# Patient Record
Sex: FEMALE | Race: WHITE | NOT HISPANIC OR LATINO | URBAN - METROPOLITAN AREA
[De-identification: names, ages, dates, MRNs, and addresses within clinical notes are randomized per-mention and may not be internally consistent; named-entity substitution may affect disease eponyms.]

---

## 2021-07-12 ENCOUNTER — TELEPHONE (OUTPATIENT)
Dept: OBSTETRICS AND GYNECOLOGY | Facility: CLINIC | Age: 21
End: 2021-07-12

## 2021-07-12 ENCOUNTER — OFFICE VISIT (OUTPATIENT)
Dept: OBSTETRICS AND GYNECOLOGY | Facility: CLINIC | Age: 21
End: 2021-07-12
Payer: COMMERCIAL

## 2021-07-12 VITALS
HEIGHT: 66 IN | BODY MASS INDEX: 22.15 KG/M2 | WEIGHT: 137.81 LBS | SYSTOLIC BLOOD PRESSURE: 100 MMHG | DIASTOLIC BLOOD PRESSURE: 70 MMHG

## 2021-07-12 DIAGNOSIS — Z01.419 WELL WOMAN EXAM WITH ROUTINE GYNECOLOGICAL EXAM: Primary | ICD-10-CM

## 2021-07-12 DIAGNOSIS — N63.0 BREAST LUMP: ICD-10-CM

## 2021-07-12 PROCEDURE — 87481 CANDIDA DNA AMP PROBE: CPT | Mod: 59 | Performed by: STUDENT IN AN ORGANIZED HEALTH CARE EDUCATION/TRAINING PROGRAM

## 2021-07-12 PROCEDURE — 99385 PREV VISIT NEW AGE 18-39: CPT | Mod: S$GLB,,, | Performed by: STUDENT IN AN ORGANIZED HEALTH CARE EDUCATION/TRAINING PROGRAM

## 2021-07-12 PROCEDURE — 87491 CHLMYD TRACH DNA AMP PROBE: CPT | Mod: 59 | Performed by: STUDENT IN AN ORGANIZED HEALTH CARE EDUCATION/TRAINING PROGRAM

## 2021-07-12 PROCEDURE — 87591 N.GONORRHOEAE DNA AMP PROB: CPT | Performed by: STUDENT IN AN ORGANIZED HEALTH CARE EDUCATION/TRAINING PROGRAM

## 2021-07-12 PROCEDURE — 99385 PR PREVENTIVE VISIT,NEW,18-39: ICD-10-PCS | Mod: S$GLB,,, | Performed by: STUDENT IN AN ORGANIZED HEALTH CARE EDUCATION/TRAINING PROGRAM

## 2021-07-15 LAB
BACTERIAL VAGINOSIS DNA: NEGATIVE
CANDIDA GLABRATA DNA: NEGATIVE
CANDIDA KRUSEI DNA: NEGATIVE
CANDIDA RRNA VAG QL PROBE: NEGATIVE
T VAGINALIS RRNA GENITAL QL PROBE: NEGATIVE

## 2021-07-16 LAB
C TRACH DNA SPEC QL NAA+PROBE: NOT DETECTED
N GONORRHOEA DNA SPEC QL NAA+PROBE: NOT DETECTED

## 2021-07-19 ENCOUNTER — HOSPITAL ENCOUNTER (OUTPATIENT)
Dept: RADIOLOGY | Facility: OTHER | Age: 21
Discharge: HOME | End: 2021-07-19
Attending: STUDENT IN AN ORGANIZED HEALTH CARE EDUCATION/TRAINING PROGRAM
Payer: COMMERCIAL

## 2021-07-19 DIAGNOSIS — N63.0 BREAST LUMP: ICD-10-CM

## 2021-07-19 PROCEDURE — 76642 ULTRASOUND BREAST LIMITED: CPT | Mod: TC,RT

## 2021-07-19 PROCEDURE — 76642 US BREAST RIGHT LIMITED: ICD-10-PCS | Mod: 26,RT,, | Performed by: RADIOLOGY

## 2021-07-19 PROCEDURE — 76642 ULTRASOUND BREAST LIMITED: CPT | Mod: 26,RT,, | Performed by: RADIOLOGY

## 2021-11-12 ENCOUNTER — OFFICE VISIT (OUTPATIENT)
Dept: UROLOGY | Facility: CLINIC | Age: 21
End: 2021-11-12
Payer: COMMERCIAL

## 2021-11-12 VITALS
HEART RATE: 109 BPM | SYSTOLIC BLOOD PRESSURE: 127 MMHG | TEMPERATURE: 99 F | WEIGHT: 133 LBS | DIASTOLIC BLOOD PRESSURE: 81 MMHG | BODY MASS INDEX: 21.38 KG/M2 | HEIGHT: 66 IN

## 2021-11-12 DIAGNOSIS — R30.0 DYSURIA: Primary | ICD-10-CM

## 2021-11-12 DIAGNOSIS — R39.15 URINARY URGENCY: ICD-10-CM

## 2021-11-12 DIAGNOSIS — N39.0 RECURRENT UTI: ICD-10-CM

## 2021-11-12 DIAGNOSIS — R35.0 URINARY FREQUENCY: ICD-10-CM

## 2021-11-12 PROCEDURE — 1160F RVW MEDS BY RX/DR IN RCRD: CPT | Mod: CPTII,S$GLB,, | Performed by: NURSE PRACTITIONER

## 2021-11-12 PROCEDURE — 3074F SYST BP LT 130 MM HG: CPT | Mod: CPTII,S$GLB,, | Performed by: NURSE PRACTITIONER

## 2021-11-12 PROCEDURE — 1159F MED LIST DOCD IN RCRD: CPT | Mod: CPTII,S$GLB,, | Performed by: NURSE PRACTITIONER

## 2021-11-12 PROCEDURE — 1160F PR REVIEW ALL MEDS BY PRESCRIBER/CLIN PHARMACIST DOCUMENTED: ICD-10-PCS | Mod: CPTII,S$GLB,, | Performed by: NURSE PRACTITIONER

## 2021-11-12 PROCEDURE — 87086 URINE CULTURE/COLONY COUNT: CPT | Performed by: NURSE PRACTITIONER

## 2021-11-12 PROCEDURE — 3079F DIAST BP 80-89 MM HG: CPT | Mod: CPTII,S$GLB,, | Performed by: NURSE PRACTITIONER

## 2021-11-12 PROCEDURE — 3079F PR MOST RECENT DIASTOLIC BLOOD PRESSURE 80-89 MM HG: ICD-10-PCS | Mod: CPTII,S$GLB,, | Performed by: NURSE PRACTITIONER

## 2021-11-12 PROCEDURE — 3074F PR MOST RECENT SYSTOLIC BLOOD PRESSURE < 130 MM HG: ICD-10-PCS | Mod: CPTII,S$GLB,, | Performed by: NURSE PRACTITIONER

## 2021-11-12 PROCEDURE — 3008F PR BODY MASS INDEX (BMI) DOCUMENTED: ICD-10-PCS | Mod: CPTII,S$GLB,, | Performed by: NURSE PRACTITIONER

## 2021-11-12 PROCEDURE — 1159F PR MEDICATION LIST DOCUMENTED IN MEDICAL RECORD: ICD-10-PCS | Mod: CPTII,S$GLB,, | Performed by: NURSE PRACTITIONER

## 2021-11-12 PROCEDURE — 99203 OFFICE O/P NEW LOW 30 MIN: CPT | Mod: S$GLB,,, | Performed by: NURSE PRACTITIONER

## 2021-11-12 PROCEDURE — 3008F BODY MASS INDEX DOCD: CPT | Mod: CPTII,S$GLB,, | Performed by: NURSE PRACTITIONER

## 2021-11-12 PROCEDURE — 99203 PR OFFICE/OUTPT VISIT, NEW, LEVL III, 30-44 MIN: ICD-10-PCS | Mod: S$GLB,,, | Performed by: NURSE PRACTITIONER

## 2021-11-12 RX ORDER — NITROFURANTOIN MACROCRYSTALS 50 MG/1
50 CAPSULE ORAL NIGHTLY
Qty: 90 CAPSULE | Refills: 1 | Status: SHIPPED | OUTPATIENT
Start: 2021-11-12 | End: 2022-05-11

## 2021-11-12 RX ORDER — SULFAMETHOXAZOLE AND TRIMETHOPRIM 800; 160 MG/1; MG/1
1 TABLET ORAL 2 TIMES DAILY
Qty: 20 TABLET | Refills: 0 | Status: SHIPPED | OUTPATIENT
Start: 2021-11-12 | End: 2021-11-22

## 2021-11-12 RX ORDER — NITROFURANTOIN (MACROCRYSTALS) 100 MG/1
100 CAPSULE ORAL 2 TIMES DAILY
COMMUNITY

## 2021-11-16 LAB — BACTERIA UR CULT: NO GROWTH

## 2024-09-24 ENCOUNTER — OFFICE VISIT (OUTPATIENT)
Dept: PRIMARY CARE CLINIC | Facility: CLINIC | Age: 24
End: 2024-09-24
Payer: COMMERCIAL

## 2024-09-24 ENCOUNTER — PATIENT MESSAGE (OUTPATIENT)
Dept: PRIMARY CARE CLINIC | Facility: CLINIC | Age: 24
End: 2024-09-24

## 2024-09-24 ENCOUNTER — LAB VISIT (OUTPATIENT)
Dept: LAB | Facility: HOSPITAL | Age: 24
End: 2024-09-24
Attending: STUDENT IN AN ORGANIZED HEALTH CARE EDUCATION/TRAINING PROGRAM
Payer: COMMERCIAL

## 2024-09-24 VITALS
WEIGHT: 125.69 LBS | HEIGHT: 66 IN | HEART RATE: 68 BPM | SYSTOLIC BLOOD PRESSURE: 124 MMHG | BODY MASS INDEX: 20.2 KG/M2 | DIASTOLIC BLOOD PRESSURE: 84 MMHG

## 2024-09-24 DIAGNOSIS — Z00.00 ANNUAL PHYSICAL EXAM: ICD-10-CM

## 2024-09-24 DIAGNOSIS — Z76.89 ESTABLISHING CARE WITH NEW DOCTOR, ENCOUNTER FOR: ICD-10-CM

## 2024-09-24 DIAGNOSIS — F41.8 TEST ANXIETY: ICD-10-CM

## 2024-09-24 DIAGNOSIS — Z78.9 VEGETARIAN: ICD-10-CM

## 2024-09-24 DIAGNOSIS — R79.89 ELEVATED LFTS: Primary | ICD-10-CM

## 2024-09-24 DIAGNOSIS — Z00.00 ANNUAL PHYSICAL EXAM: Primary | ICD-10-CM

## 2024-09-24 LAB
25(OH)D3+25(OH)D2 SERPL-MCNC: 34 NG/ML (ref 30–96)
ALBUMIN SERPL BCP-MCNC: 4.7 G/DL (ref 3.5–5.2)
ALP SERPL-CCNC: 55 U/L (ref 55–135)
ALT SERPL W/O P-5'-P-CCNC: 24 U/L (ref 10–44)
ANION GAP SERPL CALC-SCNC: 9 MMOL/L (ref 8–16)
AST SERPL-CCNC: 52 U/L (ref 10–40)
BASOPHILS # BLD AUTO: 0.03 K/UL (ref 0–0.2)
BASOPHILS NFR BLD: 0.6 % (ref 0–1.9)
BILIRUB SERPL-MCNC: 0.6 MG/DL (ref 0.1–1)
BUN SERPL-MCNC: 18 MG/DL (ref 6–20)
CALCIUM SERPL-MCNC: 10 MG/DL (ref 8.7–10.5)
CHLORIDE SERPL-SCNC: 105 MMOL/L (ref 95–110)
CHOLEST SERPL-MCNC: 209 MG/DL (ref 120–199)
CHOLEST/HDLC SERPL: 3.5 {RATIO} (ref 2–5)
CO2 SERPL-SCNC: 25 MMOL/L (ref 23–29)
CREAT SERPL-MCNC: 0.8 MG/DL (ref 0.5–1.4)
DIFFERENTIAL METHOD BLD: NORMAL
EOSINOPHIL # BLD AUTO: 0.1 K/UL (ref 0–0.5)
EOSINOPHIL NFR BLD: 2.4 % (ref 0–8)
ERYTHROCYTE [DISTWIDTH] IN BLOOD BY AUTOMATED COUNT: 12 % (ref 11.5–14.5)
EST. GFR  (NO RACE VARIABLE): >60 ML/MIN/1.73 M^2
ESTIMATED AVG GLUCOSE: 88 MG/DL (ref 68–131)
FERRITIN SERPL-MCNC: 35 NG/ML (ref 20–300)
GLUCOSE SERPL-MCNC: 85 MG/DL (ref 70–110)
HBA1C MFR BLD: 4.7 % (ref 4–5.6)
HCT VFR BLD AUTO: 45.7 % (ref 37–48.5)
HCV AB SERPL QL IA: NORMAL
HDLC SERPL-MCNC: 60 MG/DL (ref 40–75)
HDLC SERPL: 28.7 % (ref 20–50)
HGB BLD-MCNC: 15.1 G/DL (ref 12–16)
HIV 1+2 AB+HIV1 P24 AG SERPL QL IA: NORMAL
IMM GRANULOCYTES # BLD AUTO: 0.02 K/UL (ref 0–0.04)
IMM GRANULOCYTES NFR BLD AUTO: 0.4 % (ref 0–0.5)
IRON SERPL-MCNC: 113 UG/DL (ref 30–160)
LDLC SERPL CALC-MCNC: 135.8 MG/DL (ref 63–159)
LYMPHOCYTES # BLD AUTO: 1.7 K/UL (ref 1–4.8)
LYMPHOCYTES NFR BLD: 30.4 % (ref 18–48)
MCH RBC QN AUTO: 29.2 PG (ref 27–31)
MCHC RBC AUTO-ENTMCNC: 33 G/DL (ref 32–36)
MCV RBC AUTO: 88 FL (ref 82–98)
MONOCYTES # BLD AUTO: 0.5 K/UL (ref 0.3–1)
MONOCYTES NFR BLD: 8.5 % (ref 4–15)
NEUTROPHILS # BLD AUTO: 3.1 K/UL (ref 1.8–7.7)
NEUTROPHILS NFR BLD: 57.7 % (ref 38–73)
NONHDLC SERPL-MCNC: 149 MG/DL
NRBC BLD-RTO: 0 /100 WBC
PLATELET # BLD AUTO: 279 K/UL (ref 150–450)
PMV BLD AUTO: 10.2 FL (ref 9.2–12.9)
POTASSIUM SERPL-SCNC: 4.4 MMOL/L (ref 3.5–5.1)
PROT SERPL-MCNC: 8.2 G/DL (ref 6–8.4)
RBC # BLD AUTO: 5.17 M/UL (ref 4–5.4)
SATURATED IRON: 26 % (ref 20–50)
SODIUM SERPL-SCNC: 139 MMOL/L (ref 136–145)
TOTAL IRON BINDING CAPACITY: 429 UG/DL (ref 250–450)
TRANSFERRIN SERPL-MCNC: 290 MG/DL (ref 200–375)
TRIGL SERPL-MCNC: 66 MG/DL (ref 30–150)
TSH SERPL DL<=0.005 MIU/L-ACNC: 1.04 UIU/ML (ref 0.4–4)
VIT B12 SERPL-MCNC: 498 PG/ML (ref 210–950)
WBC # BLD AUTO: 5.42 K/UL (ref 3.9–12.7)

## 2024-09-24 PROCEDURE — 86803 HEPATITIS C AB TEST: CPT | Performed by: STUDENT IN AN ORGANIZED HEALTH CARE EDUCATION/TRAINING PROGRAM

## 2024-09-24 PROCEDURE — 85025 COMPLETE CBC W/AUTO DIFF WBC: CPT | Performed by: STUDENT IN AN ORGANIZED HEALTH CARE EDUCATION/TRAINING PROGRAM

## 2024-09-24 PROCEDURE — 83036 HEMOGLOBIN GLYCOSYLATED A1C: CPT | Performed by: STUDENT IN AN ORGANIZED HEALTH CARE EDUCATION/TRAINING PROGRAM

## 2024-09-24 PROCEDURE — 3008F BODY MASS INDEX DOCD: CPT | Mod: CPTII,S$GLB,, | Performed by: STUDENT IN AN ORGANIZED HEALTH CARE EDUCATION/TRAINING PROGRAM

## 2024-09-24 PROCEDURE — 80053 COMPREHEN METABOLIC PANEL: CPT | Performed by: STUDENT IN AN ORGANIZED HEALTH CARE EDUCATION/TRAINING PROGRAM

## 2024-09-24 PROCEDURE — 82728 ASSAY OF FERRITIN: CPT | Performed by: STUDENT IN AN ORGANIZED HEALTH CARE EDUCATION/TRAINING PROGRAM

## 2024-09-24 PROCEDURE — 80061 LIPID PANEL: CPT | Performed by: STUDENT IN AN ORGANIZED HEALTH CARE EDUCATION/TRAINING PROGRAM

## 2024-09-24 PROCEDURE — 84443 ASSAY THYROID STIM HORMONE: CPT | Performed by: STUDENT IN AN ORGANIZED HEALTH CARE EDUCATION/TRAINING PROGRAM

## 2024-09-24 PROCEDURE — 1159F MED LIST DOCD IN RCRD: CPT | Mod: CPTII,S$GLB,, | Performed by: STUDENT IN AN ORGANIZED HEALTH CARE EDUCATION/TRAINING PROGRAM

## 2024-09-24 PROCEDURE — 3074F SYST BP LT 130 MM HG: CPT | Mod: CPTII,S$GLB,, | Performed by: STUDENT IN AN ORGANIZED HEALTH CARE EDUCATION/TRAINING PROGRAM

## 2024-09-24 PROCEDURE — 36415 COLL VENOUS BLD VENIPUNCTURE: CPT | Mod: PN | Performed by: STUDENT IN AN ORGANIZED HEALTH CARE EDUCATION/TRAINING PROGRAM

## 2024-09-24 PROCEDURE — 82607 VITAMIN B-12: CPT | Performed by: STUDENT IN AN ORGANIZED HEALTH CARE EDUCATION/TRAINING PROGRAM

## 2024-09-24 PROCEDURE — 87389 HIV-1 AG W/HIV-1&-2 AB AG IA: CPT | Performed by: STUDENT IN AN ORGANIZED HEALTH CARE EDUCATION/TRAINING PROGRAM

## 2024-09-24 PROCEDURE — 3079F DIAST BP 80-89 MM HG: CPT | Mod: CPTII,S$GLB,, | Performed by: STUDENT IN AN ORGANIZED HEALTH CARE EDUCATION/TRAINING PROGRAM

## 2024-09-24 PROCEDURE — 1160F RVW MEDS BY RX/DR IN RCRD: CPT | Mod: CPTII,S$GLB,, | Performed by: STUDENT IN AN ORGANIZED HEALTH CARE EDUCATION/TRAINING PROGRAM

## 2024-09-24 PROCEDURE — 82306 VITAMIN D 25 HYDROXY: CPT | Performed by: STUDENT IN AN ORGANIZED HEALTH CARE EDUCATION/TRAINING PROGRAM

## 2024-09-24 PROCEDURE — 99204 OFFICE O/P NEW MOD 45 MIN: CPT | Mod: S$GLB,,, | Performed by: STUDENT IN AN ORGANIZED HEALTH CARE EDUCATION/TRAINING PROGRAM

## 2024-09-24 PROCEDURE — 83540 ASSAY OF IRON: CPT | Performed by: STUDENT IN AN ORGANIZED HEALTH CARE EDUCATION/TRAINING PROGRAM

## 2024-09-24 PROCEDURE — 99999 PR PBB SHADOW E&M-EST. PATIENT-LVL III: CPT | Mod: PBBFAC,,, | Performed by: STUDENT IN AN ORGANIZED HEALTH CARE EDUCATION/TRAINING PROGRAM

## 2024-09-24 RX ORDER — PROPRANOLOL HYDROCHLORIDE 10 MG/1
10 TABLET ORAL DAILY PRN
Qty: 30 TABLET | Refills: 0 | Status: SHIPPED | OUTPATIENT
Start: 2024-09-24

## 2024-09-24 NOTE — PROGRESS NOTES
Maty Lea  2000        Subjective     Chief Complaint: Est Care    History of Present Illness:  Ms. Maty Lea is a 24 y.o. female who presents to clinic for est care and annual.     Recently started limiting red meat and sugar. Has lost some weight intentionally. Would like to check vitamins/labs.    In OT school. Has been having some test anxiety/ with presentations. Feels very anxious physically, racing heart, sweating, etc right before presenting and for exams.     Due for pap smear.   Has appt with new OBGYN on 1/17.  Due for HPV vaccine series as well. Has not had. Discussed MOA.   She will consider it.    BP Readings from Last 5 Encounters:   09/24/24 124/84   11/12/21 127/81   07/12/21 100/70       Review of Systems   Constitutional:  Negative for chills, fever and malaise/fatigue.   HENT:  Negative for congestion.    Respiratory:  Negative for cough, sputum production, shortness of breath and wheezing.    Gastrointestinal:  Negative for nausea and vomiting.   Musculoskeletal:  Negative for joint pain.   Neurological:  Negative for sensory change, speech change, focal weakness and weakness.   Psychiatric/Behavioral:  The patient is not nervous/anxious.         PAST HISTORY:     History reviewed. No pertinent past medical history.    History reviewed. No pertinent surgical history.    Family History   Problem Relation Name Age of Onset    Diabetes Paternal Grandmother      Stroke Paternal Grandmother      Breast cancer Neg Hx      Colon cancer Neg Hx      Ovarian cancer Neg Hx           MEDICATIONS & ALLERGIES:     Current Outpatient Medications on File Prior to Visit   Medication Sig    [DISCONTINUED] ibuprofen (ADVIL,MOTRIN) 100 MG tablet Take 100 mg by mouth every 6 (six) hours as needed for Temperature greater than.    [DISCONTINUED] methen-m.blue-s.phos-phsal-hyo (URIBEL) 118-10-40.8-36 mg Cap Take 1 capsule by mouth 3 (three) times daily as needed (bladder pain).     "[DISCONTINUED] nitrofurantoin (MACRODANTIN) 100 MG capsule Take 100 mg by mouth 2 (two) times a day.    [DISCONTINUED] UNKNOWN TO PATIENT Inject into the muscle once. Cetriaxone sodium injection     No current facility-administered medications on file prior to visit.       Review of patient's allergies indicates:  No Known Allergies    OBJECTIVE:     Vital Signs:  Vitals:    09/24/24 0749   BP: 124/84   BP Location: Left arm   Patient Position: Sitting   BP Method: Medium (Manual)   Pulse: 68   Weight: 57 kg (125 lb 10.6 oz)   Height: 5' 6" (1.676 m)       Body mass index is 20.28 kg/m².     Physical Exam:  Physical Exam  Vitals and nursing note reviewed.   Constitutional:       General: She is not in acute distress.     Appearance: Normal appearance. She is not ill-appearing, toxic-appearing or diaphoretic.   HENT:      Head: Normocephalic and atraumatic.      Right Ear: Tympanic membrane, ear canal and external ear normal.      Left Ear: Tympanic membrane, ear canal and external ear normal.      Mouth/Throat:      Mouth: Mucous membranes are moist.      Pharynx: No oropharyngeal exudate or posterior oropharyngeal erythema.   Eyes:      General: No scleral icterus.        Right eye: No discharge.         Left eye: No discharge.      Conjunctiva/sclera: Conjunctivae normal.   Cardiovascular:      Rate and Rhythm: Normal rate and regular rhythm.      Pulses: Normal pulses.      Heart sounds: Normal heart sounds. No murmur heard.  Pulmonary:      Effort: Pulmonary effort is normal. No respiratory distress.      Breath sounds: Normal breath sounds. No wheezing.   Abdominal:      Tenderness: There is no right CVA tenderness or left CVA tenderness.   Musculoskeletal:         General: Normal range of motion.      Cervical back: Normal range of motion and neck supple. No rigidity or tenderness.      Right lower leg: No edema.      Left lower leg: No edema.   Lymphadenopathy:      Cervical: No cervical adenopathy.   Skin:   " "  General: Skin is warm and dry.   Neurological:      Mental Status: She is alert and oriented to person, place, and time. Mental status is at baseline.      Gait: Gait normal.   Psychiatric:         Mood and Affect: Mood normal.         Behavior: Behavior normal.            Laboratory  No results found for: "GLU", "NA", "K", "CL", "CO2", "BUN", "CREATININE", "CALCIUM", "MG"  No results found for: "HGBA1C"  No results for input(s): "POCTGLUCOSE" in the last 72 hours.        ASSESSMENT & PLAN:   Ms. Maty Lea is a 24 y.o. female who was seen today in clinic for est care, annual, test anxiety. Trial low dose Propranolol prn. SE and MOA of BB discussed. Break in half or stop using if dizziness/light-headedness occurs.   Discussed HPV series. Would recommend. Has OBGYN for pap coming up.     1. Annual physical exam  -     CBC Auto Differential; Future; Expected date: 09/24/2024  -     Comprehensive Metabolic Panel; Future; Expected date: 09/24/2024  -     Hemoglobin A1C; Future; Expected date: 09/24/2024  -     Lipid Panel; Future; Expected date: 09/24/2024  -     TSH; Future; Expected date: 09/24/2024  -     Vitamin D; Future; Expected date: 09/24/2024  -     Hepatitis C Antibody; Future; Expected date: 09/24/2024  -     HIV 1/2 Ag/Ab (4th Gen); Future; Expected date: 09/24/2024  -     IRON AND TIBC; Future; Expected date: 09/24/2024  -     Ferritin; Future  -     VITAMIN B12; Future; Expected date: 09/24/2024    2. Establishing care with new doctor, encounter for  -     CBC Auto Differential; Future; Expected date: 09/24/2024  -     Comprehensive Metabolic Panel; Future; Expected date: 09/24/2024  -     Hemoglobin A1C; Future; Expected date: 09/24/2024  -     Lipid Panel; Future; Expected date: 09/24/2024  -     TSH; Future; Expected date: 09/24/2024  -     Vitamin D; Future; Expected date: 09/24/2024  -     Hepatitis C Antibody; Future; Expected date: 09/24/2024  -     HIV 1/2 Ag/Ab (4th Gen); Future; " Expected date: 09/24/2024  -     IRON AND TIBC; Future; Expected date: 09/24/2024  -     Ferritin; Future  -     VITAMIN B12; Future; Expected date: 09/24/2024    3. Test anxiety  -     propranoloL (INDERAL) 10 MG tablet; Take 1 tablet (10 mg total) by mouth daily as needed (Test anxiety/presentations). Monitor for dizziness/light-headedness  Dispense: 30 tablet; Refill: 0  -     CBC Auto Differential; Future; Expected date: 09/24/2024  -     Comprehensive Metabolic Panel; Future; Expected date: 09/24/2024  -     Hemoglobin A1C; Future; Expected date: 09/24/2024  -     Lipid Panel; Future; Expected date: 09/24/2024  -     TSH; Future; Expected date: 09/24/2024  -     Vitamin D; Future; Expected date: 09/24/2024  -     Hepatitis C Antibody; Future; Expected date: 09/24/2024  -     HIV 1/2 Ag/Ab (4th Gen); Future; Expected date: 09/24/2024  -     IRON AND TIBC; Future; Expected date: 09/24/2024  -     Ferritin; Future  -     VITAMIN B12; Future; Expected date: 09/24/2024    4. Vegetarian  -     CBC Auto Differential; Future; Expected date: 09/24/2024  -     Comprehensive Metabolic Panel; Future; Expected date: 09/24/2024  -     Hemoglobin A1C; Future; Expected date: 09/24/2024  -     Lipid Panel; Future; Expected date: 09/24/2024  -     TSH; Future; Expected date: 09/24/2024  -     Vitamin D; Future; Expected date: 09/24/2024  -     Hepatitis C Antibody; Future; Expected date: 09/24/2024  -     HIV 1/2 Ag/Ab (4th Gen); Future; Expected date: 09/24/2024  -     IRON AND TIBC; Future; Expected date: 09/24/2024  -     Ferritin; Future  -     VITAMIN B12; Future; Expected date: 09/24/2024             Becca Mccall MD

## 2024-10-21 DIAGNOSIS — F41.8 TEST ANXIETY: ICD-10-CM

## 2024-10-21 NOTE — TELEPHONE ENCOUNTER
Refill Routing Note   Medication(s) are not appropriate for processing by [facility] for the following reason(s):        Outside of protocol: PRN use oop  New or recently adjusted medication    [facility] action(s):  Route  Defer             Appointments  past 12m or future 3m with PCP    Date Provider   Last Visit   9/24/2024 Becca Mccall MD   Next Visit   Visit date not found Becca Mccall MD   ED visits in past 90 days: 0        Note composed:5:36 PM 10/21/2024

## 2024-10-21 NOTE — TELEPHONE ENCOUNTER
No care due was identified.  [facility] Embedded Care Due Messages. Reference number: [id number].   10/21/2024 3:07:33 AM CDT

## 2024-10-22 RX ORDER — PROPRANOLOL HYDROCHLORIDE 10 MG/1
TABLET ORAL
Qty: 90 TABLET | Refills: 3 | Status: SHIPPED | OUTPATIENT
Start: 2024-10-22

## 2024-10-30 ENCOUNTER — TELEPHONE (OUTPATIENT)
Dept: OBSTETRICS AND GYNECOLOGY | Facility: CLINIC | Age: 24
End: 2024-10-30
Payer: COMMERCIAL

## 2025-01-07 ENCOUNTER — LAB VISIT (OUTPATIENT)
Dept: LAB | Facility: HOSPITAL | Age: 25
End: 2025-01-07
Attending: STUDENT IN AN ORGANIZED HEALTH CARE EDUCATION/TRAINING PROGRAM
Payer: COMMERCIAL

## 2025-01-07 DIAGNOSIS — R79.89 ELEVATED LFTS: ICD-10-CM

## 2025-01-07 LAB
ALBUMIN SERPL BCP-MCNC: 4.1 G/DL (ref 3.5–5.2)
ALP SERPL-CCNC: 44 U/L (ref 40–150)
ALT SERPL W/O P-5'-P-CCNC: 13 U/L (ref 10–44)
AST SERPL-CCNC: 20 U/L (ref 10–40)
BILIRUB DIRECT SERPL-MCNC: 0.2 MG/DL (ref 0.1–0.3)
BILIRUB SERPL-MCNC: 0.6 MG/DL (ref 0.1–1)
PROT SERPL-MCNC: 7.4 G/DL (ref 6–8.4)

## 2025-01-07 PROCEDURE — 80076 HEPATIC FUNCTION PANEL: CPT | Performed by: STUDENT IN AN ORGANIZED HEALTH CARE EDUCATION/TRAINING PROGRAM

## 2025-01-07 PROCEDURE — 36415 COLL VENOUS BLD VENIPUNCTURE: CPT | Performed by: STUDENT IN AN ORGANIZED HEALTH CARE EDUCATION/TRAINING PROGRAM

## 2025-01-28 ENCOUNTER — OFFICE VISIT (OUTPATIENT)
Dept: OBSTETRICS AND GYNECOLOGY | Facility: CLINIC | Age: 25
End: 2025-01-28
Payer: COMMERCIAL

## 2025-01-28 VITALS
BODY MASS INDEX: 20.37 KG/M2 | DIASTOLIC BLOOD PRESSURE: 77 MMHG | WEIGHT: 126.75 LBS | SYSTOLIC BLOOD PRESSURE: 129 MMHG | HEIGHT: 66 IN

## 2025-01-28 DIAGNOSIS — Z01.419 ENCOUNTER FOR ANNUAL ROUTINE GYNECOLOGICAL EXAMINATION: Primary | ICD-10-CM

## 2025-01-28 PROCEDURE — 99999 PR PBB SHADOW E&M-EST. PATIENT-LVL III: CPT | Mod: PBBFAC,,, | Performed by: OBSTETRICS & GYNECOLOGY

## 2025-01-28 PROCEDURE — 3008F BODY MASS INDEX DOCD: CPT | Mod: CPTII,S$GLB,, | Performed by: OBSTETRICS & GYNECOLOGY

## 2025-01-28 PROCEDURE — 1160F RVW MEDS BY RX/DR IN RCRD: CPT | Mod: CPTII,S$GLB,, | Performed by: OBSTETRICS & GYNECOLOGY

## 2025-01-28 PROCEDURE — 1159F MED LIST DOCD IN RCRD: CPT | Mod: CPTII,S$GLB,, | Performed by: OBSTETRICS & GYNECOLOGY

## 2025-01-28 PROCEDURE — 3078F DIAST BP <80 MM HG: CPT | Mod: CPTII,S$GLB,, | Performed by: OBSTETRICS & GYNECOLOGY

## 2025-01-28 PROCEDURE — 99385 PREV VISIT NEW AGE 18-39: CPT | Mod: S$GLB,,, | Performed by: OBSTETRICS & GYNECOLOGY

## 2025-01-28 PROCEDURE — 88175 CYTOPATH C/V AUTO FLUID REDO: CPT | Performed by: OBSTETRICS & GYNECOLOGY

## 2025-01-28 PROCEDURE — 3074F SYST BP LT 130 MM HG: CPT | Mod: CPTII,S$GLB,, | Performed by: OBSTETRICS & GYNECOLOGY

## 2025-01-28 NOTE — PROGRESS NOTES
SUBJECTIVE:     Chief Complaint: Well Woman       History of Present Illness:  Annual Exam  Patient presents for annual exam.   She c/o nothing today.  LMP: 1/11/25  She denies any vd, vb, dyspareunia, dysuria, depression, anxiety.  Last pap was in na and was na. HPV na.  Birth Control: none, declines  declines STD testing.   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  Not having penetrative sex.   Has not had HPV vaccine, would like to get today.     GYN screening history:  denies  Mammogram history: none  Colonoscopy history: none  Dexa history: none    FH:   Breast cancer: none  Colon cancer: none  Ovarian cancer: none    Review of patient's allergies indicates:  No Known Allergies    No past medical history on file.  No past surgical history on file.  OB History    No obstetric history on file.       Family History   Problem Relation Name Age of Onset    Diabetes Paternal Grandmother      Stroke Paternal Grandmother      Breast cancer Neg Hx      Colon cancer Neg Hx      Ovarian cancer Neg Hx       Social History     Tobacco Use    Smoking status: Never    Smokeless tobacco: Never   Substance Use Topics    Alcohol use: Yes    Drug use: Never       Current Outpatient Medications   Medication Sig    propranoloL (INDERAL) 10 MG tablet TAKE 1 TABLET BY MOUTH EVERY DAY AS NEEDED. MONITOR FOR DIZZINESS/LIGHT-HEADEDNESS     No current facility-administered medications for this visit.       Review of Systems:  GENERAL: No fever, chills, fatigability or weight loss.  CARDIOVASCULAR: No chest pain. No palpitations.  RESPIRATORY: No SOB, no wheezing.  BREAST: Denies pain. No lumps. No discharge.  VULVAR: No pain, no lesions and no itching.  VAGINAL: No relaxation, no itching, no discharge, no abnormal bleeding and no lesions.  ABDOMEN: No abdominal pain. Denies nausea. Denies vomiting. No diarrhea. No constipation  URINARY: No incontinence, no nocturia, no frequency and no dysuria.  NEUROLOGICAL: No headaches. No  vision changes.       OBJECTIVE:     Vitals:    01/28/25 1029   BP: 129/77       Patient verbally consents to pelvic and breast exams. Female chaperone present for exams.   Physical Exam:  Gen: NAD, well developed, well-nourished  HEENT: Normocephalic, atraumatic  Eyes: EOM nl, conjuntivae normal  Neck: ROM normal, no thyromegaly  Respiratory: Effort normal   Abd: soft, nontender, no masses palpated  Breast: Normal bilaterally, no masses, lesions or tenderness. No nipple discharge on expression, no lymphadenopathy bilaterally.  SSE:  Vulva: no lesions or rashes  Cervix: No lesions noted, nonfriable, no vaginal discharge or vaginal bleeding noted  BME:   Cervix: No CMT  Adnexa: nl bilaterally, no masses or fullness palpated  Uterus: normal, nonenlarged  Musculoskeletal: normal ROM  Neuro: alert, AAOx3  Skin: warm and dry  Psych: mood/affect nl, behavior normal, judgement normal, thought content normal        ASSESSMENT:       ICD-10-CM ICD-9-CM    1. Encounter for annual routine gynecological examination  Z01.419 V72.31 Liquid-Based Pap Smear, Screening             Plan:      Maty was seen today for well woman.    Diagnoses and all orders for this visit:    Encounter for annual routine gynecological examination  -     Liquid-Based Pap Smear, Screening    Other orders  -     HPV Vaccine (9-Valent) (3 Dose) (IM); Future        Orders Placed This Encounter   Procedures    HPV Vaccine (9-Valent) (3 Dose) (IM)       Patient was counseled today on ACS guidelines and recommendations for yearly pelvic exams, mammograms and monthly self breast exams; to see her PCP for other health maintenance.      Follow up in one year for annual, or prn.    Julie R Jeansonne

## 2025-01-31 LAB
CLINICAL INFO: NORMAL
CYTO CVX: NORMAL
CYTOLOGIST CVX/VAG CYTO: NORMAL
CYTOLOGIST CVX/VAG CYTO: NORMAL
CYTOLOGY CMNT CVX/VAG CYTO-IMP: NORMAL
CYTOLOGY PAP THIN PREP EXPLANATION: NORMAL
DATE OF PREVIOUS PAP: NORMAL
DATE PREVIOUS BX: NORMAL
GEN CATEG CVX/VAG CYTO-IMP: NORMAL
LMP START DATE: NORMAL
MICROORGANISM CVX/VAG CYTO: NORMAL
PATHOLOGIST CVX/VAG CYTO: NORMAL
SERVICE CMNT-IMP: NORMAL
SPECIMEN SOURCE CVX/VAG CYTO: NORMAL
STAT OF ADQ CVX/VAG CYTO-IMP: NORMAL

## 2025-04-09 DIAGNOSIS — F41.8 TEST ANXIETY: ICD-10-CM

## 2025-04-09 RX ORDER — PROPRANOLOL HYDROCHLORIDE 10 MG/1
10 TABLET ORAL DAILY PRN
Qty: 90 TABLET | Refills: 0 | Status: SHIPPED | OUTPATIENT
Start: 2025-04-09

## 2025-04-09 NOTE — TELEPHONE ENCOUNTER
No care due was identified.  [facility] Embedded Care Due Messages. Reference number: [id number].   4/09/2025 8:42:33 AM CDT

## 2025-06-05 DIAGNOSIS — F41.8 TEST ANXIETY: ICD-10-CM

## 2025-06-05 RX ORDER — PROPRANOLOL HYDROCHLORIDE 10 MG/1
10 TABLET ORAL DAILY PRN
Qty: 90 TABLET | Refills: 0 | Status: SHIPPED | OUTPATIENT
Start: 2025-06-05

## 2025-06-27 ENCOUNTER — PATIENT MESSAGE (OUTPATIENT)
Dept: PRIMARY CARE CLINIC | Facility: CLINIC | Age: 25
End: 2025-06-27
Payer: COMMERCIAL

## 2025-06-27 DIAGNOSIS — F41.8 TEST ANXIETY: ICD-10-CM

## 2025-06-27 RX ORDER — PROPRANOLOL HYDROCHLORIDE 10 MG/1
10 TABLET ORAL DAILY PRN
Qty: 90 TABLET | Refills: 0 | Status: CANCELLED | OUTPATIENT
Start: 2025-06-27

## 2025-06-27 NOTE — TELEPHONE ENCOUNTER
No care due was identified.  [facility] Embedded Care Due Messages. Reference number: [id number].   6/27/2025 8:00:41 AM CDT